# Patient Record
Sex: MALE | Race: WHITE
[De-identification: names, ages, dates, MRNs, and addresses within clinical notes are randomized per-mention and may not be internally consistent; named-entity substitution may affect disease eponyms.]

---

## 2018-05-13 ENCOUNTER — HOSPITAL ENCOUNTER (OUTPATIENT)
Dept: HOSPITAL 46 - ED | Age: 68
Setting detail: OBSERVATION
LOS: 3 days | Discharge: HOME | End: 2018-05-16
Attending: SURGERY | Admitting: SURGERY
Payer: MEDICARE

## 2018-05-13 VITALS — HEIGHT: 70 IN | WEIGHT: 231.79 LBS | BODY MASS INDEX: 33.18 KG/M2

## 2018-05-13 DIAGNOSIS — Z86.73: ICD-10-CM

## 2018-05-13 DIAGNOSIS — E03.9: ICD-10-CM

## 2018-05-13 DIAGNOSIS — E86.0: ICD-10-CM

## 2018-05-13 DIAGNOSIS — G47.00: ICD-10-CM

## 2018-05-13 DIAGNOSIS — N17.9: ICD-10-CM

## 2018-05-13 DIAGNOSIS — Z79.891: ICD-10-CM

## 2018-05-13 DIAGNOSIS — Z79.899: ICD-10-CM

## 2018-05-13 DIAGNOSIS — E79.0: ICD-10-CM

## 2018-05-13 DIAGNOSIS — K56.609: Primary | ICD-10-CM

## 2018-05-13 DIAGNOSIS — N40.0: ICD-10-CM

## 2018-05-13 DIAGNOSIS — E78.5: ICD-10-CM

## 2018-05-13 DIAGNOSIS — Z79.02: ICD-10-CM

## 2018-05-13 DIAGNOSIS — Z79.82: ICD-10-CM

## 2018-05-13 DIAGNOSIS — I12.9: ICD-10-CM

## 2018-05-13 DIAGNOSIS — N18.4: ICD-10-CM

## 2018-05-13 DIAGNOSIS — E66.9: ICD-10-CM

## 2018-05-13 PROCEDURE — G0378 HOSPITAL OBSERVATION PER HR: HCPCS

## 2018-05-13 NOTE — NUR
ED ADMIT TODAY. LIVES ALONE, BUT SISTER LIVES NEXT DOOR. STROKE IN 2005 AND
2007. RIGHT SIDED WEAKNESS. 1PA TO AMBULATE. NG TUBE TO LIS. MATUTE CATHETER
FOR STRICT I&Os. LOPRESSOR Q4H. D5LR @ 125, SWITCH TO D5NS WHEN BAG EMPTY.
NPO-- NO ICE, HARD CANDY OK. CONTINUOUS PULSE OX. I/S.

## 2018-05-13 NOTE — NUR
BEDSIDE REPORT RECEIVED FROM OFFGOING RN. PT LYING IN BED, PARTICATES IN
REPORT. PT JOKES THAT HE WOULD LIKE A PEPSI. DENIES NEEDS AT THIS TIME. CALL
LIGHT WITHIN REACH.

## 2018-05-13 NOTE — NUR
PT ASSESSMENT COMPLETE. PT DENIES PAIN OR SOB. STATES THAT NAUSEA IS WAXING
AND WANING, BUT TYPICALLY PASSES WITHOUT INTERVENTION. PT STATES THAT HE WILL
LET RN KNOW IF NAUSEA DOES NOT SUBSIDE. BT'S HYPO ACTIVE. NG TUBE TO LIWS.
TRACE EDEMA PRESENT TO BLE'S. HALFDOLLAR SIZED REDDENED AREA TO INNER ASPECT
OF R CALF. PT STATES THAT THIS CECELIA OR "BRUISE" HAS BEEN PRESENT FOR SOME
TIME, AND IS NOT NEW. PT HAS ICE WATER PRESENT AT BEDSIDE FOR ORAL SWAB. PT
UNDERSTANDS NPO STATUS. PT DENIES NEEDS AT THIS TIME. CALL LIGHT WITHIN REACH.

## 2018-05-13 NOTE — EKG
[facility]
                                    [address]
                                  [place]  [address]
_________________________________________________________________________________________
                                                                 Signed   
 
 
Normal sinus rhythm
Normal ECG
No previous ECGs available
Confirmed by QIAN DELUCA MD ([id number]) on 5/13/2018 9:30:53 PM
 
 
 
 
 
 
 
 
 
 
 
 
 
 
 
 
 
 
 
 
 
 
 
 
 
 
 
 
 
 
 
 
 
 
 
 
 
 
 
 
 
    Electronically Signed By: QIAN DELUCA MD  05/13/18 2131
_________________________________________________________________________________________
PATIENT NAME:     MADIHA ISLAS                    
MEDICAL RECORD #: [id number]                     Electrocardiogram             
          ACCT #: [id number]  
DATE OF BIRTH:   [birth year]                                       
PHYSICIAN:   QIAN DELUCA MD           REPORT #: [id number]
REPORT IS CONFIDENTIAL AND NOT TO BE RELEASED WITHOUT AUTHORIZATION

## 2018-05-13 NOTE — NUR
PT RESTING IN BED WATCHING TV. PT STATES THAT HE IS STARTING TO GET TIRED. PT
DENIES NEEDS AT THIS TIME. CALL LIGHT WITHIN REACH.

## 2018-05-13 NOTE — NUR
70ml green output from NG tube in canister. marked by this RN on canister to
differentiate next shift output. ivf pump zeroed.

## 2018-05-14 NOTE — NUR
METOPROLOL DUE. THIS RN TO BEDSIDE. PT RESTING IN BED. WATCHING GAME. VITALS
TAKEN. MEDICATION GIVEN AS ORDERED. BED RAILS UP. CALL LIGHT WITHIN REACH.

## 2018-05-14 NOTE — NUR
VITALS TAKEN AT RN'S REQUEST. PATIENT COMPLAINS OF DIZZINESS. PATIENT STATES
IF HE STILL FEELS DIZZY LATER, HE WONT WANT A SHOWER. PATIENT OFFERED BATH
WIPES. PATIENT REQUESTED TO WAIT A LITTLE BIT BEFORE WASHING UP. PATIENT CALL
LIGHT IN REACH. FRESH ICE CHIPS AT BEDSIDE TABLE. NO OTHER NEEDS AT THIS TIME.

## 2018-05-14 NOTE — NUR
FOCUSSED ASSESSMENT DUE. THIS RN TO BEDSIDE. PT STATES THAT DIZZINESS AND
NASUEA "IS BETTER." ASSESSMENT DONE. PT STATES HE HAS NO REQUESTS OR
COMPLAINTS AT THIS TIME. PT ENCOUARGED TO AMBULATE. PT STATE HE IS READY TO
TRY. CNA NOTIFIED. CALL LIGHT WITHIN REACH. BED RAILS UP X2.

## 2018-05-14 NOTE — NUR
PT UTILIZES CALL LIGHT, FOUND SITTING UP IN RECLINER WITH IV CATHETER IN HAND.
IV SITE DC'D. PT TOLERATED WELL. NEW IV PLACED IN R HAND, PT ALSO TOLERATED
THIS PROCEDURE WELL. IV SITE FLUSHES WELL, NO S/SX OF LEAKING OR INFILTRATION.
PT HAS NO REPORTS OF PAIN OR BURNING. UPON IV FLUSH PT STATES IT FEELS "COLD".
PT DENIES OTHER NEEDS AT THIS TIME. STATES THAT HE WOULD LIKE TO TRY TO GET
SOME SLEEP. CALL LIGHT WITHIN PT'S REACH.

## 2018-05-14 NOTE — NUR
PT RESTING IN BED WATCHING TV. STATES THAT HE HAS BEEN UNABLE TO SLEEP. STATES
HE IS "PEACEFUL". DENIES NEEDS AT THIS TIME. CALL LIGHT WITHIN REACH.

## 2018-05-14 NOTE — NUR
PT ASSESSMENT COMPLETE. PT DENIES PAIN, NAUSEA, OR SOB. ASSESSMENT UNCHANGED
FROM PRIOR. BT'S REMAIN HYPOACTIVE. ABD SLIGHTLY TENDER TO PALPATION. PT
STATES THIS IS DUE TO DRY HEAVING AT HOME PRIOR TO ADMISSION. PT DENIES NEEDS
AT THIS TIME. CALL LIGHT WITHIN REACH.

## 2018-05-14 NOTE — NUR
PT ASSESSMENT COMPLETE. PT DENIES PAIN, NAUSEA, SOB. NG TUBE REMAINS CONNECTED
TO LIWS WITH CLEAR TO YELLOW DRAINAGE. BOWEL TONES HYPOACTIVE TO ACTIVE. PT
REPORTS PASSING FLATUS AND HAVING BM DURING THE DAY. HALF DOLLAR REDDNESS
CONTINUES TO R LE. TRACE EDEMA TO BLE'S. PT REQUESTS TO GET UP TO THE
RECLINER. PT TOLERATED TRANSFER WELL. REPORTS THAT HE WOULD LIKE TO WALK IN
THE [place] A LITTLE LATER BEFORE BED. PT PROVIDED WITH WATER FOR MOUTH SWAB.
DENIES OTHER NEEDS AT THIS TIME CALL LIGHT WITHIN REACH.

## 2018-05-14 NOTE — CONS
[facility]
                                    [address], [place]  [address]
_________________________________________________________________________________________
                                                                 Signed   
 
 
DATE OF CONSULTATION:
2018
 
CHIEF COMPLAINT:
Nausea and vomiting.
 
HISTORY OF PRESENT ILLNESS:
Madiha is a 68-year-old gentleman, who thinks for about a month, he had a dry hacky
cough consistent with the flu.  He thought maybe that was better, but then he started
having nausea and vomiting for at least 10 days.  He said he cannot eat anything solid,
maybe a little bit of liquids and that is it.  He came to the emergency room for
evaluation and his abdominal exam was pretty unremarkable.  His white count is normal.
He has acute on chronic renal failure.  His urine showed some bacteria and blood, and he
underwent a CT scan of the abdomen and pelvis where his right kidney is completely
atrophic.  The left has some perinephric fat stranding.  His urinary bladder seems to be
pretty distended and he has some mildly distended loops of proximal small bowel, maybe 4
cm that was decompressed, but somewhere in the mid abdomen.  Consequently, I was asked
to admit him as a general surgeon on-call.  In the meantime, he has received an NG tube
with some light bilious fluid and overall seems to be comfortable. 
 
PAST MEDICAL HISTORY:
BPH, chronic renal failure, hypertension, hypothyroidism, hypercholesterolemia,
insomnia, stroke x2 affecting his right side of his body and colonic polyps. 
 
PAST SURGICAL HISTORY:
Kidney stones and colonoscopy in 2014 with Dr. Nowak.
 
SOCIAL HISTORY:
He does not smoke or drink.  Dr. Ken South is primary care provider.  Estee Herrera, his sister at [phone].  They live next to each other in separate houses in
[place], [place].  He continues to drive.  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  He did not give a specific pharmacy.  Mom had some type of cancer.
 Dad  of an MI. 
 
REVIEW OF SYSTEMS:
Madiha had 10 systems reviewed and he is pretty knowledgeable, nothing new to add.
 
ALLERGIES:
None.
 
MEDICATIONS:
Allopurinol, atenolol, fenofibrate, levothyroxine, lisinopril, hydrochlorothiazide,
melatonin, Percocet, Plavix, Abilify, pravastatin, aspirin, and Tylenol. 
 
 
    Electronically Signed By: TAYA MORALES MD  18 0557
_________________________________________________________________________________________
PATIENT NAME:     MADIHA ISLAS                    
MEDICAL RECORD #: [id number]            CONSULTATION                  
          ACCT #: [id number]  
DATE OF BIRTH:   [birth year]            REPORT #: [id number]      
PHYSICIAN:        TAYA MOARLES MD             
PCP:              JAMAAL SOUTH MD           
REPORT IS CONFIDENTIAL AND NOT TO BE RELEASED WITHOUT AUTHORIZATION
 
 
                                  [facility]
                                    [address], [place]  [address]
_________________________________________________________________________________________
                                                                 Signed   
 
 
PHYSICAL EXAMINATION:
VITAL SIGNS:  Blood pressure 120/61, his heart rate is 85, his respiratory rate is 18,
and temperature is 98.4.  He is 97% on room air.  He is 5 feet 10 inches at 103 kg. 
GENERAL:  Madiha is a 68-year-old gentleman, who was sitting supine semi-recumbent in
his hospital bed with the nurse in the room. 
HEENT:  His mouth is dry.  His NG tube is in place with light bilious fluid.  He does
not appear systemically ill or toxic. 
LUNGS:  Clear to auscultation bilaterally. 
HEART:  Regular rate and rhythm. 
ABDOMEN:  Soft, nontender, and nondistended.  There are no hernias.
 
LABORATORY DATA:
His white blood count is 7.3, hemoglobin 11.7, mean cell volume 74.  His potassium is 5,
his BUN is 94, his creatinine is 3.8.  His urinalysis showed some bacteria and blood.
__________ a little high at 10.3.  Liver function tests are negative.  Albumin is 4.6.
Amylase 66, lipase 137. 
 
RADIOGRAPHIC STUDIES:
CT scan of abdomen and pelvis shows a tiny 4 mm right lower lobe lung nodule, some mild
left perinephric fat stranding, complete atrophy of the right kidney, some distended
loops of proximal small bowel with a transition in the mid abdomen and degenerative disk
disease in his lumbar spine. 
 
ASSESSMENT AND PLAN:
Madiha is a 68-year-old gentleman, who presents with a small bowel obstruction with
dehydration and acute on chronic renal failure.  He has been admitted, given IV fluids
and NG tube placed.  I think we will withhold any antibiotics currently.  Hopefully,
this will resolve in a few days.  I reviewed all this with Madiha in detail.  His chance
to have an actual bowel obstruction is low, since he has never had previous abdominal
surgery, but it is not possible.  However, it has bothered him for probably couple of
weeks.  He has expressed understanding and would like to proceed conservatively. 
 
 
 
            ________________________________________
            Taya Morales MD 
 
 
ALB/MODL
Job #:  [id number]
DD:  2018 14:34:18
DT:  2018 15:06:23
 
cc:            Taya Morales MD 
 
    Electronically Signed By: TAYA MORALES MD  18 0557
_________________________________________________________________________________________
PATIENT NAME:     MADIHA ISLAS                    
MEDICAL RECORD #: [id number]            CONSULTATION                  
          ACCT #: [id number]  
DATE OF BIRTH:   [birth year]            REPORT #: [id number]      
PHYSICIAN:        TAYA MORALES MD             
PCP:              JAMAAL SOUTH MD           
REPORT IS CONFIDENTIAL AND NOT TO BE RELEASED WITHOUT AUTHORIZATION
 
 
                                  [facility]
                                    [address] [place], [place]  [address]
_________________________________________________________________________________________
                                                                 Signed   
 
 
 
               Jamaal South MD
 
 
Copies:  TAYA MORALES MD, JONATHAN MD
~
 
 
 
 
 
 
 
 
 
 
 
 
 
 
 
 
 
 
 
 
 
 
 
 
 
 
 
 
 
 
 
 
 
 
 
 
 
 
    Electronically Signed By: TAYA MORALES MD  18 0557
_________________________________________________________________________________________
PATIENT NAME:     MADIHA ISLAS                    
MEDICAL RECORD #: [id number]            CONSULTATION                  
          ACCT #: [id number]  
DATE OF BIRTH:   [birth year]            REPORT #: [id number]      
PHYSICIAN:        TAYA MORALES MD             
PCP:              JAMAAL SOTUH MD           
REPORT IS CONFIDENTIAL AND NOT TO BE RELEASED WITHOUT AUTHORIZATION

## 2018-05-14 NOTE — NUR
PATIENT WIPED DOWN WITH BED BATH WIPES. THIS CNA ASSISTED PATIENT TO DRESS IN
CLEAN GOWN. THIS CNA PERFORMED CATH CARE, AND NOTICED VERY LITTLE WHITE
DISCHARGE COMING FROM CATHETER INSERTION SITE. RN NOTIFIED. PATIENTS CALL
LIGHT IN REACH, PATIENT REQUESTING CURTAINS CLOSED FOR A NAP. NO OTHER NEEDS
AT THIS TIME.

## 2018-05-14 NOTE — NUR
PT AWAKE MOST OF NIGHT. DENIES PAIN OR SOB. PT HAS NAUSEA OFF AND ON. NO
EPISODES OF EMESIS THIS SHIFT. BT'S HYPOACTIVE. ABD NONTENDER THIS AM. NG TO
FLOR. MATUTE CATH DRAINING CLEAR YELLOW URINE. SCTRICT I&O. 1 PA. CARLOS @125.
NPO EXCEPT HARD CANDY.

## 2018-05-14 NOTE — NUR
PATIENT RESTING IN BED. PERSONAL ITEMS WITHIN REACH. CALL LIGHT IN REACH. NO
OTHER NEEDS AT THIS TIME.

## 2018-05-14 NOTE — NUR
MORNING ASSESSMENT AND MEDICATIONS DUE. THIS RN TO BEDSIDE. PT RESTING IN BED.
PT REPORTS "DIZZINESS WHEN I GOT UP THIS MORNING."
ASSESSMENT DONE. VITALS TAKEN. MEDICATIONS GIVEN (SEE MAR). PT WATCHING TV. NO
ADDITIONAL REQUESTS OR COMPLAINTS AT THIS TIME. CALL LIGHT WITHIN REACH. BED
RAILS UP.

## 2018-05-14 NOTE — NUR
PATIENT UP AND WALKING HALLWAYS. PATIENT STATED HE WAS SLIGHTLY DIZZY WHILE
WALKING, RETURNED TO ROOM TO SIT FOR A FEW SECONDS.

## 2018-05-14 NOTE — NUR
BEDSIDE REPORT RECEIVED FROM OFFGOING RN. PT LYING IN BED WATCHING TV. PT
DENIES NEEDS AT THIS TIME. CALL LIGHT WITHIN REACH.

## 2018-05-14 NOTE — NUR
PATIENT RESTING IN BED. PATIENT REQUESTED TO WASH HANDS AND FACE AND PERFORM
ORAL CARE AT A LATER TIME. CALL LIGHT IN REACH. NO OTHER NEEDS AT THIS TIME.

## 2018-05-14 NOTE — NUR
PT HERE FOR POSSIBLE SBO. NG TUBE IN PLACE TO LOW INTERMITANT SUCTION. NPO
WITH HARD CANDIES FOR COMFORT ONLY. 1 PERSON STAND BY ASSIST. MATUTE CATHETER
DC'D TODAY. CONTINIOUS PULSE OX. RIGHT SIDED WEAKNESS FROM PREVIOUS STROKES,
PT ABLE TO AMBULATE X2 TODAY WITH 1 PERSON AND FWW. D5LR  THROUGH 20G
LAC PIV. PT REPORT IMPROVED NAUSEA AND NO PAIN TODAY. USES CALL LIGHT
APPROPRIATLY.

## 2018-05-14 NOTE — NUR
PATIENT RESTING IN BED, PATIENT GIVEN CLEAN GOWN AND BATH WIPES TO WASH UP PER
PATIENT'S REQUEST. CALL LIGHT IN REACH. PRIVACY REQUESTED. NO OTHER NEEDS AT
THIS TIME.

## 2018-05-14 NOTE — NUR
PT ASSESSMENT COMPLETE. PT DENIES PAIN, OR SOB. PT REPORTS NAUSEA THAT CAME
AND WENT "A LITTLE EARLIER". PT DENIES EMESIS, OR NEED FOR INTERVENTION. PT
DENIES ABDOMINAL TENDERNESS. BT'S CONTINUE TO BE HYPOACTIVE. MATUTE CATH
DRAINING CLEAR YELLOW URINE. SLIGHT EDEMA TO BLE'S CONTINUES. PT DENIES NEEDS
AT THIS TIME. CALL LIGHT WITHIN REACH.

## 2018-05-14 NOTE — NUR
PT RESTINGIN BED, VISITOR IN . STAYED JUST A MOMENT TO LET VISIT CONTINUE.
PT ALERT AND ORIENTED, VERY FRIENDLY. NO PAIN AT MOMENT-NG TUBE IN PLACE. I
EXTENDED A BLESSING, PT THANKED ME. WILL CONTINUE TO FOLLOW AS NEEDED

## 2018-05-14 NOTE — NUR
PT ASSISTED UP TO WALK WITH CNA. NG TUBE SEQURED. MATUTE AND IV POLE TRAVELING
WITH PT. PT DENIES DIZZINESS AS HE STANDS UP AND BEGINS WALKING. FWW USED FOR
PT CONFIDENCE.

## 2018-05-14 NOTE — NUR
PT REPORTING NAUSEA THAT "COMES AND GOES." STATING THAT "I JUST FEEL LIKE I
HAVE TO THROW UP AND THEN IT GOES AWAY." PT ASKED IF HE WOULD LIKE MEDICATION
FOR THE NASUEA. PT STATES YES. SEE MAR FOR MEDICATION GIVEN (GIVEN BY CHARGE
RN, MANUEL).

## 2018-05-14 NOTE — NUR
AFTERNOON ASSESSMENT DUE. THIS RN TO BEDSIDE. PT RESTING IN BED WATCHING TV.
PT ENCOURAGED TO AMBULATE AGAIN. PT AGREES TO AMBULATE AT 1615. CNA TO RETURN.
ASSESSMENT DONE. PT VERBALIZES UNDERSTANDING OF CHANGES SINCE REMOVAL OF
CATHETER. NEW FLUIDS HUNG. PT STATES HE HAS NO REQUESTS OR COMPLAINTS AND PAIN
AND NAUSEA "HAVE BEEN GONE FOR A WHILE." BED RAILS UP. CALL LIGHT WITHIN
REACH.

## 2018-05-15 NOTE — NUR
MORNING ASSESSMENT DUE. THIS RN TO BEDSIDE. PT UP TO CHAIR WATCHING TV. PT
STATES "I FEEL MUCH BETTER." PT DENIES PAIN AND NAUSEA. NG TUBE CONTINUES TO
BE AT LOW INTERMITANT SUCTION, YELLOW/CLEAR MUCOUS LIKE DRAINAGE NOTED IN
CANISTER. PT ENCOURAGED TO AMBULATE TODAY. PT AGREES TO GO FOR A WALK AFTER
MORNING ASSESSMENT/MEDICATION. PULSE OX ON, PT % ON ROOM AIR. STUDENT RN
WORKING WITH PT. CALL LIGHT AND URINAL WITHIN REACH.

## 2018-05-15 NOTE — NUR
PT HERE FOR POSSIBLE SBO. 1 PERSON STAND BY ASSIST WITH FWW. ENCOURAGE
AMBULATION. NG TUBE DC'D TODAY, PT TOLERATING CLEAR LIQUID DIET W/O PAIN OR
NAUSEA. D5LR RUNNING AT 125ML/HR. MEDICATIONS NOW PO. PT USING CALL LIGHT
APPROPRIATLY.

## 2018-05-15 NOTE — NUR
MEDICATIONS DUE. THIS RN TO BEDSIDE. MEDICATIONS GIVEN AS ORDERED. PT ASSISTED
UP TO RESTROOM AND BACK TO CHAIR. PT ABLE TO PASS GAS AND HAVE BM. NO
ADDITIONAL REQUESTS OR COMPLAINTS AT THIS TIME. CALL LIGHT WITHIN REACH.

## 2018-05-15 NOTE — NUR
PT WALKED 1.5 LAPS IN HALLWAY WITH FWW. TOLERATED WELL. DENIED NAUSEA,
DIZZINESS, SOB. BACK TO BED. CALL LIGHT IN REACH. DENIES OTHER NEEDS.

## 2018-05-15 NOTE — NUR
PT IN GOOD MOOD TODAY. SITTING IN CHAIR-ALERT AND ORIENTED. PT PLEASED THAT
NG TUBE AND CATHETHER IS REMOVED! PT EXPRESSED PLEASURE THAT HE COULD NOW
DRINK WATER AND HAVE A LIQUID DIET. EXTENDED A BLESSING, WILL FOLLOW AS NEEDED

## 2018-05-15 NOTE — NUR
FOCUSED ASSESSMENT DUE. THIS RN TO BEDSIDE. PT UP TO CHAIR. PT REPORTS
"FEELIGN SO GOOD AFTER MY SHOWER." PT WOULD LIKE TO GET NG TUBE OUT SOON IF
POSSIBLE. ASSESSMENT DONE. PT WATCHING TV. NO REQUESTS OR COMPLAINTS. CALL
[facility] WITHIN REACH. STUDENT RN ENCOUARED TO ASSIST PT WITH USING URINAL TO SEE
IF HE IS ABLE TO VOID AT THIS TIME.

## 2018-05-15 NOTE — NUR
PT DID NOT SLEEP WELL THIS SHIFT. PT STATES THIS IS NORMAL FOR HIM. NO C/O
PAIN, NAUSEA, SOB. NG TO LIWS, YELLOW MUCOUS OUTPUT PRESENT IN CANISTER. BT'S
ACTIVE. PT HAD SMALL BM OVERNIGHT, PASSING FLATUS. PT REMAINS NPO. FOAM MOUTH
SWABS AT BEDSIDE. MAY HAVE HARD CANDY. NEW IV PLACED LAST NIGHT. PT AMULATING
IN HALLS. 1 PA WITH FWW. D5L5 @ 125.

## 2018-05-15 NOTE — NUR
AFTERNOON ASSESSMENT AND MEDICATIONS DUE. THIS RN TO BEDSIDE. PT ASSISTED FROM
CHAIR BACK TO BED PER REQUEST. PT CONTINUES TO DENY PAIN AND NAUSEA.
TOLERATING CLEAR DIET WELL. ASSESSMENT DONE. MEDICATIONS GIVEN (SEE MAR). PT
WATCHING BASKETBALL GAME AND EXCITED FOR "MORE THINGS TO DRINK." WATER
REFILLED. CALL LIGHT WITHIN REACH. BED RAILS UP.

## 2018-05-15 NOTE — NUR
BEDSIDE REPORT RECEIVED FROM SVETA MCDONALD. PT AWAKE, SITTING UP IN BED, DENIES
NAUSEA, DENIES PAIN, ATTENDS IN PLACE, DISCUSSED CALL LIGHT FOR SAFETY, SBA,
PT VERBALIZED UNDERSTANDING. REQUESTED EAR PLUGS FOR SLEEP. CALL LIGHT IN
REACH, IVF INFUSING WNL.

## 2018-05-15 NOTE — NUR
PATIENT SITTING UP IN BED. PATIENT AMBULATED WITH STUDENT NURSE AT 1830 IN THE
HALLWAY. CALL LIGHT WITHIN REACH. NO OTHER NEEDS AT THIS TIME.

## 2018-05-15 NOTE — NUR
PT ASSESSMENT COMPLETE. UNCHANGED FROM PREVIOUS. PT DENIES PAIN, NAUSEA, SOB.
BT'S HYPOACTIVE TO ACTIVE. PT REPORTS NO PASSING OF FLATUS OR BM THIS SHIFT.
PT REQUESTING TO GET UP AND USE THE BATHROOM, FEELS LIKE HE NEEDS TO HAVE A
BOWEL MOVEMENT. PT ASSISTED TO THE BATHROOM WITH 1PA. PT WANTING TO ATTEMPT TO
TRANSFER AS INDEPENDENTLY AS POSSIBLE IN PREPARATION FOR RETURNING HOME. PT
HAS CALL LIGHT WITHIN REACH IN THE BATHROOM AND AGREES TO USE WHEN FINISH FOR
ASSISTANCE BACK TO BED.

## 2018-05-15 NOTE — NUR
PT ASSESSMENT COMPLETE. BOWEL TONES ACTIVE X 4, ABDOMEN NONTENDER, OBESE, PT
STATES ABD NORMAL, DENIES PAIN, DENIES NAUSEA. TOLERATING CLEAR LIQUIDS WELL.
LUNGS CLEAR THROUGHOUT, HR REGULAR. IVF INFUSING WNL, FLUSHED W 10 NS WNL. CSM
INTACT BUE, BLE. GIVEN ICE WATER AS REQUESTED, CALL LIGHT IN REACH.

## 2018-05-15 NOTE — NUR
CHECKED ON PT, APPEARS TO BE SLEEPING, VISIBLE CHEST RISE, BREATHING
NON-LABORED, LIGHTS OFF IN ROOM. IVF INFUSING. WILL CONTINUE TO MONITOR.

## 2018-05-15 NOTE — NUR
THIS RN ATTEMPTED TO CALL MD ON CELL PHONE AND OFFICE ABOUT URINE OUTPUT AND
PTS REQUEST FOR NG TUBE REMOVAL. WILL CALL AGAIN.

## 2018-05-15 NOTE — NUR
PT CALL LIGHT ON. PT REQUESTS ASSISTANCE UP TO RESTROOM. PT ASSISTED TO
RESTROOM. ANJEL PASSED, SMALL BM. PT BACK TO BED. PULSE OX DC'D. PT WATCHING
GAME. NO ADDITIONAL REQUESTS OR COMPLAINTS AT THIS TIME. CALL LIGHT WITHIN
REACH.

## 2018-05-15 NOTE — NUR
pt requests to get up and sit in the chair for a while before he attempts to
use the bathroom this am. plant o tanisha around 0630 to help pt to the
bathroom. pt agrees with plan. call light within reach.

## 2018-05-15 NOTE — NUR
PATIENT AMBULATED IN [place] WITH STUDENT NURSE. PATIENT DID ONE LAP IN THE
HALLWAY. NO OTHER NEEDS AT THIS TIME.

## 2018-05-15 NOTE — NUR
NG TUBE DC'D PER PROTOCOL AS ORDERED BY MD. PT TOLERATED PROCEDURE WELL. ICE
WATER PROVIDED FOR PT. PT TAKING SIPS OF WATER AND STATES "I'M SO HAPPY NOW."
CALL [place] WITHIN REACH.

## 2018-05-15 NOTE — NUR
PT SITTING AT BEDSIDE WHILE CNAS IN ROOM CHANGING LINENS. PT STATES THAT HE
SPILLED HIS WATER WHILE ATTEMPTING TO TURN OVER. PT MAKING JOKES ABOUT THE
SITUATION. REPORTS THAT HE HAS BEEN UNABLE TO SLEEP THUS FAR. PT DENIES NEEDS
AT THIS TIME. CALL LIGHT WITHIN REACH.

## 2018-05-16 NOTE — NUR
IN PT ROOM TO HANG NEW BAG IVF, SBA TO RESTROOM  ML VOID, EVITA
COLORED. SBA BACK TO BED, PT GAIT STEADY. IVF INFUSING WNL. PT CONTINUES TO
DENY NAUSEA. GIVEN ICE WATER. CALL LIGHT IN REACH.

## 2018-05-16 NOTE — NUR
PT SITTING IN CHAIR, WATCHING TV. FRIENDLY AS ALWAYS, AND GREETED ME WARMLY.
STAYED JUST A BRIEF MOMENT, EXTENDED A BLESSING, PT THANKED ME. WILL FOLLOW AS
NEEDED

## 2018-05-16 NOTE — NUR
BEDSIDE REPORT RECEIVED FROM GIULIANA BANGURA. WHITE BOARD UPDATED. PATIENT APPEARS
COMFORTABLE LYING IN BED. IVF INFUSING .

## 2018-05-16 NOTE — NUR
DISCUSSED DISCHARGE INFORMATION WITH PATIENT AND REMOVED IV. PATIENT THANKFUL
FOR CARE PROVIDED WHILE IN THE HOSPITAL. PHARMACIST TO TALK WITH PATIENT
BEFORE DISCHARGE. VITAL SIGNS AND WHEELCHAIR RIDE BEFORE DISCHARGING HOME.

## 2018-05-16 NOTE — NUR
PT HAS DENIED PAIN, DENIED NAUSEA THROUGHOUT SHIFT. TOLERATING CLEAR LIQUID
DIET WELL. SBA WITH AMBULATION TO RESTROOM. BOWEL TONES ACTIVE THROUGHOUT
SHIFT, NONTENDER W PALPATION. IVF INFUSING WNL THROUGHOUT SHIFT. PT HAS
APPEARED TO SLEEP THROUGHOUT NIGHT.

## 2018-05-16 NOTE — NUR
PT IN BATHROOM UPON ENTERING ROOM. REPORTS HAVING BM. NO VOID. DOCUMENTED ON
I&O SHEET. PATIENT SITTING UP IN CHAIR NOW WATCHING TELEVISION. IVF INFUSING
INTO RIGHT HAND. TOLERATED CLEAR LIQUID DIET WELL. NO NAUSEA.

## 2018-05-16 NOTE — NUR
CHECKED ON PT, PT AWAKE, LYING IN BED, DROWSY, STATES "EYES STILL FEEL HEAVY".
ASSESSMENT COMPLETE, LUNGS CLEAR THROUGHOUT ALL LOBES, HR REGULAR RHYTHM.
BOWEL TONES ACTIVE X 4. ABDOMEN SOFT, NON-TENDER. PT DENIES NAUSEA. DENIES
PAIN. TOLERATING CLEAR LIQUID DIET. PT DENIES TOILETING NEEDS AT THIS TIME.
IVF INFUSING WNL. CALL LIGHT IN REACH.

## 2018-05-17 NOTE — DS
[facility]
                                    [address], [place]  [address]
_________________________________________________________________________________________
                                                                 Signed   
 
 
ADMISSION DATE:  05/13/2018
 
DISCHARGE DATE:  05/16/2018
 
FINAL DIAGNOSES:
1. Gastroenteritis.
2. Dehydration.
3. Acute on chronic renal failure.
4. Anemia.
 
PROCEDURES:
1. CT scan of abdomen and pelvis.
2. Chest x-ray.
 
HISTORY OF PRESENT ILLNESS:
Madiha is a 68-year-old obese gentleman, who has an atrophic right kidney.  He has some
chronic renal failure with his left kidney.  He describes having flu-like symptoms with
a cough for about a month and then he switched to nausea, vomiting, and dehydration over
at least 10 days.  He finally came to the emergency room for evaluation.  His mouth was
quite dry, but his abdomen was obese, but soft.  He was anemic with an elevated BUN and
creatinine and some chronic hematuria.  He had a CT scan of abdomen and pelvis done and
he had some distended loops of small bowel somewhere in the mid abdomen.  It was
transitioned into decompressed bowel, so I was asked to admit him as a possible small
bowel obstruction.  However, he had no prior abdominal surgery.  He has no hernias and
no cancer to his knowledge.  In fact, he said he had a colonoscopy in 2014 and that was
fine. 
 
HOSPITAL COURSE:
Madiha was admitted as above and aggressively hydrated.  I did have our medical service
come and see him as well.  He was rapidly improving every day.  We did have the NG tube
in place and we finally discontinued that.  His laboratory work all corrected including
his BUN and creatinine down to his baseline with a creatinine of around 2.2 to 2.5.  He
was passing good urine, so we got rid of his Ordaz catheter, pulled the NG tube and we
have him on clear liquid diet.  He has done quite well.  He has now had 4 or 5 loose
bowel movements.  He looks and feels tremendously better.  He said he feels well enough
at this point, he would like to go home. 
 
DISCHARGE PLANS AND MEDICATIONS:
Madiha will be discharged to home with no new prescriptions.  He can resume his chronic
medications at home.  He is welcome to take diet as tolerated.  He can perform his
activities of daily living as tolerated including walking up and down stairs, and
showering and bathing as usual.  He can follow up in my office as needed.  He should
 
    Electronically Signed By: TAYA MORALES MD  05/17/18 0757
_________________________________________________________________________________________
PATIENT NAME:     MADIHA ISLAS                    
MEDICAL RECORD #: [id number]            DISCHARGE SUMMARY             
          ACCT #: [id number]  
DATE OF BIRTH:   [birth year]            REPORT #: [id number]      
PHYSICIAN:        TAYA MORALES MD             
PCP:              JAMAAL MARSHALL MD           
REPORT IS CONFIDENTIAL AND NOT TO BE RELEASED WITHOUT AUTHORIZATION
 
 
                                  [facility]
                                    [address] [place] [address]  [id number]
_________________________________________________________________________________________
                                                                 Signed   
 
 
follow up with his primary care provider in the next week or 2 for general followup and
for repeat labs.  He is certainly anemic, and his phosphorus and magnesium tend to run a
little low.  At discharge, his hemoglobin was 8.2 and a mean cell volume was 76.  I 
reviewed all this with Madiha in detail.  He has expressed understanding and agrees the
above plan. 
 
 
 
            ________________________________________
            Taya Morales MD 
 
 
ALB/MODL
Job #:  [id number]
DD:  05/16/2018 13:55:02
DT:  05/17/2018 05:37:46
 
cc:            MD Taya Wallace MD
 
 
Copies:  JAMAAL MARSHALL MD, ANDREW L MD
~
 
 
 
 
 
 
 
 
 
 
 
 
 
 
 
 
 
 
    Electronically Signed By: TAYA MORALES MD  05/17/18 0757
_________________________________________________________________________________________
PATIENT NAME:     MADIHA ISLAS                    
MEDICAL RECORD #: [id number]            DISCHARGE SUMMARY             
          ACCT #: [id number]  
DATE OF BIRTH:   [birth year]            REPORT #: [id number]      
PHYSICIAN:        TAYA MORALES MD             
PCP:              JAMAAL MARSHALL MD           
REPORT IS CONFIDENTIAL AND NOT TO BE RELEASED WITHOUT AUTHORIZATION

## 2021-06-15 NOTE — NUR
PATIENT RESTING IN BED, DOCTOR IN ROOM. CALL LIGHT IN REACH. NO OTHER NEEDS AT
THIS TIME. Dapsone Pregnancy And Lactation Text: This medication is Pregnancy Category C and is not considered safe during pregnancy or breast feeding.